# Patient Record
Sex: MALE | Race: WHITE | NOT HISPANIC OR LATINO | Employment: OTHER | URBAN - METROPOLITAN AREA
[De-identification: names, ages, dates, MRNs, and addresses within clinical notes are randomized per-mention and may not be internally consistent; named-entity substitution may affect disease eponyms.]

---

## 2017-07-29 ENCOUNTER — APPOINTMENT (OUTPATIENT)
Dept: GENERAL RADIOLOGY | Facility: HOSPITAL | Age: 72
End: 2017-07-29

## 2017-07-29 PROCEDURE — 73560 X-RAY EXAM OF KNEE 1 OR 2: CPT | Performed by: FAMILY MEDICINE

## 2022-09-13 ENCOUNTER — OFFICE VISIT (OUTPATIENT)
Dept: INTERNAL MEDICINE | Facility: CLINIC | Age: 77
End: 2022-09-13

## 2022-09-13 ENCOUNTER — LAB (OUTPATIENT)
Dept: LAB | Facility: HOSPITAL | Age: 77
End: 2022-09-13

## 2022-09-13 VITALS
HEIGHT: 62 IN | BODY MASS INDEX: 40.3 KG/M2 | DIASTOLIC BLOOD PRESSURE: 68 MMHG | SYSTOLIC BLOOD PRESSURE: 124 MMHG | HEART RATE: 52 BPM | OXYGEN SATURATION: 98 % | WEIGHT: 219 LBS

## 2022-09-13 DIAGNOSIS — E78.5 HYPERLIPIDEMIA, UNSPECIFIED HYPERLIPIDEMIA TYPE: ICD-10-CM

## 2022-09-13 DIAGNOSIS — Z00.00 MEDICARE ANNUAL WELLNESS VISIT, INITIAL: ICD-10-CM

## 2022-09-13 DIAGNOSIS — I10 PRIMARY HYPERTENSION: ICD-10-CM

## 2022-09-13 DIAGNOSIS — Z00.00 HEALTHCARE MAINTENANCE: ICD-10-CM

## 2022-09-13 DIAGNOSIS — Z00.00 VISIT FOR WELL MAN HEALTH CHECK: Primary | ICD-10-CM

## 2022-09-13 DIAGNOSIS — Z12.5 SCREENING FOR PROSTATE CANCER: ICD-10-CM

## 2022-09-13 LAB
ALBUMIN SERPL-MCNC: 4.6 G/DL (ref 3.5–5.2)
ALBUMIN/GLOB SERPL: 1.6 G/DL
ALP SERPL-CCNC: 55 U/L (ref 39–117)
ALT SERPL W P-5'-P-CCNC: 16 U/L (ref 1–41)
ANION GAP SERPL CALCULATED.3IONS-SCNC: 9.6 MMOL/L (ref 5–15)
AST SERPL-CCNC: 20 U/L (ref 1–40)
BASOPHILS # BLD AUTO: 0.01 10*3/MM3 (ref 0–0.2)
BASOPHILS NFR BLD AUTO: 0.2 % (ref 0–1.5)
BILIRUB SERPL-MCNC: 0.9 MG/DL (ref 0–1.2)
BUN SERPL-MCNC: 19 MG/DL (ref 8–23)
BUN/CREAT SERPL: 17 (ref 7–25)
CALCIUM SPEC-SCNC: 9 MG/DL (ref 8.6–10.5)
CHLORIDE SERPL-SCNC: 102 MMOL/L (ref 98–107)
CHOLEST SERPL-MCNC: 164 MG/DL (ref 0–200)
CO2 SERPL-SCNC: 28.4 MMOL/L (ref 22–29)
CREAT SERPL-MCNC: 1.12 MG/DL (ref 0.76–1.27)
DEPRECATED RDW RBC AUTO: 48.8 FL (ref 37–54)
EGFRCR SERPLBLD CKD-EPI 2021: 68.5 ML/MIN/1.73
EOSINOPHIL # BLD AUTO: 0.08 10*3/MM3 (ref 0–0.4)
EOSINOPHIL NFR BLD AUTO: 1.4 % (ref 0.3–6.2)
ERYTHROCYTE [DISTWIDTH] IN BLOOD BY AUTOMATED COUNT: 13.6 % (ref 12.3–15.4)
GLOBULIN UR ELPH-MCNC: 2.8 GM/DL
GLUCOSE SERPL-MCNC: 101 MG/DL (ref 65–99)
HBA1C MFR BLD: 5.5 % (ref 4.8–5.6)
HCT VFR BLD AUTO: 43.1 % (ref 37.5–51)
HDLC SERPL-MCNC: 54 MG/DL (ref 40–60)
HGB BLD-MCNC: 14.7 G/DL (ref 13–17.7)
IMM GRANULOCYTES # BLD AUTO: 0.02 10*3/MM3 (ref 0–0.05)
IMM GRANULOCYTES NFR BLD AUTO: 0.3 % (ref 0–0.5)
LDLC SERPL CALC-MCNC: 84 MG/DL (ref 0–100)
LDLC/HDLC SERPL: 1.49 {RATIO}
LYMPHOCYTES # BLD AUTO: 2.39 10*3/MM3 (ref 0.7–3.1)
LYMPHOCYTES NFR BLD AUTO: 40.4 % (ref 19.6–45.3)
MCH RBC QN AUTO: 33.3 PG (ref 26.6–33)
MCHC RBC AUTO-ENTMCNC: 34.1 G/DL (ref 31.5–35.7)
MCV RBC AUTO: 97.5 FL (ref 79–97)
MONOCYTES # BLD AUTO: 0.4 10*3/MM3 (ref 0.1–0.9)
MONOCYTES NFR BLD AUTO: 6.8 % (ref 5–12)
NEUTROPHILS NFR BLD AUTO: 3.01 10*3/MM3 (ref 1.7–7)
NEUTROPHILS NFR BLD AUTO: 50.9 % (ref 42.7–76)
NRBC BLD AUTO-RTO: 0 /100 WBC (ref 0–0.2)
PLATELET # BLD AUTO: 172 10*3/MM3 (ref 140–450)
PMV BLD AUTO: 9.6 FL (ref 6–12)
POTASSIUM SERPL-SCNC: 4.2 MMOL/L (ref 3.5–5.2)
PROT SERPL-MCNC: 7.4 G/DL (ref 6–8.5)
PSA SERPL-MCNC: 2.12 NG/ML (ref 0–4)
RBC # BLD AUTO: 4.42 10*6/MM3 (ref 4.14–5.8)
SODIUM SERPL-SCNC: 140 MMOL/L (ref 136–145)
T-UPTAKE NFR SERPL: 1.1 TBI (ref 0.8–1.3)
T4 SERPL-MCNC: 6.72 MCG/DL (ref 4.5–11.7)
TRIGL SERPL-MCNC: 148 MG/DL (ref 0–150)
TSH SERPL DL<=0.05 MIU/L-ACNC: 1.39 UIU/ML (ref 0.27–4.2)
VLDLC SERPL-MCNC: 26 MG/DL (ref 5–40)
WBC NRBC COR # BLD: 5.91 10*3/MM3 (ref 3.4–10.8)

## 2022-09-13 PROCEDURE — 1170F FXNL STATUS ASSESSED: CPT | Performed by: FAMILY MEDICINE

## 2022-09-13 PROCEDURE — 84436 ASSAY OF TOTAL THYROXINE: CPT | Performed by: FAMILY MEDICINE

## 2022-09-13 PROCEDURE — 2014F MENTAL STATUS ASSESS: CPT | Performed by: FAMILY MEDICINE

## 2022-09-13 PROCEDURE — 85025 COMPLETE CBC W/AUTO DIFF WBC: CPT | Performed by: FAMILY MEDICINE

## 2022-09-13 PROCEDURE — 99387 INIT PM E/M NEW PAT 65+ YRS: CPT | Performed by: FAMILY MEDICINE

## 2022-09-13 PROCEDURE — 36415 COLL VENOUS BLD VENIPUNCTURE: CPT | Performed by: FAMILY MEDICINE

## 2022-09-13 PROCEDURE — G0438 PPPS, INITIAL VISIT: HCPCS | Performed by: FAMILY MEDICINE

## 2022-09-13 PROCEDURE — 1126F AMNT PAIN NOTED NONE PRSNT: CPT | Performed by: FAMILY MEDICINE

## 2022-09-13 PROCEDURE — G0103 PSA SCREENING: HCPCS | Performed by: FAMILY MEDICINE

## 2022-09-13 PROCEDURE — 80061 LIPID PANEL: CPT | Performed by: FAMILY MEDICINE

## 2022-09-13 PROCEDURE — 83036 HEMOGLOBIN GLYCOSYLATED A1C: CPT | Performed by: FAMILY MEDICINE

## 2022-09-13 PROCEDURE — 99214 OFFICE O/P EST MOD 30 MIN: CPT | Performed by: FAMILY MEDICINE

## 2022-09-13 PROCEDURE — 84443 ASSAY THYROID STIM HORMONE: CPT | Performed by: FAMILY MEDICINE

## 2022-09-13 PROCEDURE — 80053 COMPREHEN METABOLIC PANEL: CPT | Performed by: FAMILY MEDICINE

## 2022-09-13 PROCEDURE — 3008F BODY MASS INDEX DOCD: CPT | Performed by: FAMILY MEDICINE

## 2022-09-13 PROCEDURE — 1159F MED LIST DOCD IN RCRD: CPT | Performed by: FAMILY MEDICINE

## 2022-09-13 PROCEDURE — 84479 ASSAY OF THYROID (T3 OR T4): CPT | Performed by: FAMILY MEDICINE

## 2022-09-13 RX ORDER — AMLODIPINE BESYLATE 5 MG/1
1 TABLET ORAL DAILY
COMMUNITY
Start: 2022-08-29 | End: 2022-09-13 | Stop reason: SDUPTHER

## 2022-09-13 RX ORDER — ATENOLOL 25 MG/1
25 TABLET ORAL DAILY
Qty: 90 TABLET | Refills: 2 | Status: SHIPPED | OUTPATIENT
Start: 2022-09-13

## 2022-09-13 RX ORDER — ATORVASTATIN CALCIUM 20 MG/1
20 TABLET, FILM COATED ORAL NIGHTLY
Qty: 90 TABLET | Refills: 2 | Status: SHIPPED | OUTPATIENT
Start: 2022-09-13

## 2022-09-13 RX ORDER — BENZONATATE 100 MG/1
100 CAPSULE ORAL EVERY 6 HOURS PRN
COMMUNITY
End: 2022-12-06

## 2022-09-13 RX ORDER — AMLODIPINE BESYLATE 5 MG/1
5 TABLET ORAL DAILY
Qty: 90 TABLET | Refills: 2 | Status: SHIPPED | OUTPATIENT
Start: 2022-09-13

## 2022-09-13 RX ORDER — MONTELUKAST SODIUM 10 MG/1
10 TABLET ORAL NIGHTLY
COMMUNITY

## 2022-09-13 RX ORDER — ATORVASTATIN CALCIUM 20 MG/1
1 TABLET, FILM COATED ORAL NIGHTLY
COMMUNITY
Start: 2022-06-24 | End: 2022-09-13 | Stop reason: SDUPTHER

## 2022-09-13 RX ORDER — ATENOLOL 25 MG/1
1 TABLET ORAL DAILY
COMMUNITY
Start: 2022-08-29 | End: 2022-09-13 | Stop reason: SDUPTHER

## 2022-09-13 RX ORDER — ASPIRIN 81 MG/1
81 TABLET ORAL DAILY
COMMUNITY

## 2022-09-13 RX ORDER — FLUTICASONE PROPIONATE 50 MCG
1 SPRAY, SUSPENSION (ML) NASAL DAILY
COMMUNITY

## 2022-09-13 NOTE — PROGRESS NOTES
The ABCs of the Annual Wellness Visit  Initial Medicare Wellness Visit    Chief Complaint   Patient presents with   • new patient visit   • Annual Exam   • Hyperlipidemia   • Hypertension   • Medicare Wellness-subsequent     Subjective   History of Present Illness:  Sudeep Shoemaker is a 76 y.o. male who presents for an Initial Medicare Wellness Visit.    The following portions of the patient's history were reviewed and   updated as appropriate: allergies, current medications, past family history, past medical history, past social history, past surgical history and problem list.     Compared to one year ago, the patient feels his physical   health is the same.    Compared to one year ago, the patient feels his mental   health is the same.    Recent Hospitalizations:  He was not admitted to the hospital during the last year.       Current Medical Providers:  Patient Care Team:  Rey Espinoza MD as PCP - General (Family Medicine)    Outpatient Medications Prior to Visit   Medication Sig Dispense Refill   • Albuterol (VENTOLIN IN) Inhale 2 sprays 4 (Four) Times a Day As Needed.     • amLODIPine (NORVASC) 5 MG tablet Take 1 tablet by mouth Daily.     • atenolol (TENORMIN) 25 MG tablet Take 1 tablet by mouth Daily.     • atorvastatin (LIPITOR) 20 MG tablet Take 1 tablet by mouth Every Night.     • benzonatate (TESSALON) 100 MG capsule Take 100 mg by mouth Every 6 (Six) Hours As Needed for Cough.     • Cetirizine HCl 10 MG capsule Take 1 capsule by mouth Daily.     • fluticasone (FLONASE) 50 MCG/ACT nasal spray 1 spray into the nostril(s) as directed by provider Daily.     • Mometasone Furo-Formoterol Fum (DULERA IN) Inhale.     • mometasone-formoterol (DULERA 200) 200-5 MCG/ACT inhaler Inhale 2 puffs 2 (Two) Times a Day.     • montelukast (SINGULAIR) 10 MG tablet Take 10 mg by mouth Every Night.     • aspirin 81 MG EC tablet Take 81 mg by mouth Daily.     • Atorvastatin Calcium (LIPITOR PO) Take  by mouth.     •  "azithromycin (ZITHROMAX) 250 MG tablet Take 2 tablets the first day, then 1 tablet daily for 4 days. 6 tablet 0   • Codeine Polt-Chlorphen Polt ER (TUZISTRA XR) 14.7-2.8 MG/5ML Suspension Extended Release Take 10 mL by mouth 2 (Two) Times a Day. 180 mL 0   • diclofenac (VOLTAREN) 1 % gel gel Apply 4 g topically 4 (Four) Times a Day As Needed (left knee pain). 100 g 0   • Nebivolol HCl (BYSTOLIC PO) Take  by mouth.     • oxaprozin (DAYPRO) 600 MG tablet Take 2 tablets by mouth Daily. 60 tablet 0     No facility-administered medications prior to visit.       No opioid medication identified on active medication list. I have reviewed chart for other potential  high risk medication/s and harmful drug interactions in the elderly.          Aspirin is on active medication list. Aspirin use is indicated based on review of current medical condition/s. Pros and cons of this therapy have been discussed today. Benefits of this medication outweigh potential harm.  Patient has been encouraged to continue taking this medication.  .      There is no problem list on file for this patient.    Advance Care Planning  Advance Directive is not on file.  ACP discussion was held with the patient during this visit. Patient has an advance directive (not in EMR), copy requested.          Objective       Vitals:    09/13/22 1304   BP: 124/68   BP Location: Left arm   Patient Position: Sitting   Cuff Size: Adult   Pulse: 52   SpO2: 98%   Weight: 99.3 kg (219 lb)   Height: 157.5 cm (62\")   PainSc: 0-No pain     Estimated body mass index is 40.06 kg/m² as calculated from the following:    Height as of this encounter: 157.5 cm (62\").    Weight as of this encounter: 99.3 kg (219 lb).    Class 3 Severe Obesity (BMI >=40). Obesity-related health conditions include the following: hypertension. Obesity is improving with lifestyle modifications. BMI is is above average; no BMI management plan is appropriate. We discussed portion control and increasing " exercise.      Does the patient have evidence of cognitive impairment? No    Physical Exam          HEALTH RISK ASSESSMENT    Smoking Status:  Social History     Tobacco Use   Smoking Status Never Smoker   Smokeless Tobacco Never Used     Alcohol Consumption:  Social History     Substance and Sexual Activity   Alcohol Use Yes     Fall Risk Screen:    ELENO Fall Risk Assessment was completed, and patient is at LOW risk for falls.Assessment completed on:9/13/2022    Depression Screen:   PHQ-2/PHQ-9 Depression Screening 9/13/2022   Little Interest or Pleasure in Doing Things 0-->not at all   Feeling Down, Depressed or Hopeless 0-->not at all   PHQ-9: Brief Depression Severity Measure Score 0       Health Habits and Functional and Cognitive Screening:  Functional & Cognitive Status 9/13/2022   Do you have difficulty preparing food and eating? No   Do you have difficulty bathing yourself, getting dressed or grooming yourself? No   Do you have difficulty using the toilet? No   Do you have difficulty moving around from place to place? No   Do you have trouble with steps or getting out of a bed or a chair? No   Current Diet Well Balanced Diet   Dental Exam Up to date   Eye Exam Up to date   Exercise (times per week) 7 times per week   Current Exercises Include Walking   Do you need help using the phone?  No   Are you deaf or do you have serious difficulty hearing?  No   Do you need help with transportation? No   Do you need help shopping? No   Do you need help preparing meals?  No   Do you need help with housework?  No   Do you need help with laundry? No   Do you need help taking your medications? No   Do you need help managing money? No   Do you ever drive or ride in a car without wearing a seat belt? No   Have you felt unusual stress, anger or loneliness in the last month? No   Who do you live with? Spouse   If you need help, do you have trouble finding someone available to you? No   Have you been bothered in the last  four weeks by sexual problems? No   Do you have difficulty concentrating, remembering or making decisions? No       Age-appropriate Screening Schedule:  Refer to the list below for future screening recommendations based on patient's age, sex and/or medical conditions. Orders for these recommended tests are listed in the plan section. The patient has been provided with a written plan.    Health Maintenance   Topic Date Due   • LIPID PANEL  08/16/2022   • INFLUENZA VACCINE  10/01/2022   • TDAP/TD VACCINES (2 - Td or Tdap) 02/15/2026   • ZOSTER VACCINE  Completed            Assessment & Plan   CMS Preventative Services Quick Reference  Risk Factors Identified During Encounter  None Identified  The above risks/problems have been discussed with the patient.  Follow up actions/plans if indicated are seen below in the Assessment/Plan Section.  Pertinent information has been shared with the patient in the After Visit Summary.    Diagnoses and all orders for this visit:    1. Visit for well man health check (Primary)    2. Medicare annual wellness visit, initial    3. Healthcare maintenance  -     CBC & Differential  -     Comprehensive Metabolic Panel  -     Hemoglobin A1c  -     Thyroid Panel With TSH  -     Lipid Panel With LDL / HDL Ratio    4. Screening for prostate cancer  -     PSA Screen        Follow Up:  No follow-ups on file.     An After Visit Summary and PPPS were made available to the patient.

## 2022-09-27 NOTE — PROGRESS NOTES
Lily Shoemaker is a 76 y.o. male and is here for a comprehensive physical exam. The patient reports no problems.            Social History:   Social History     Socioeconomic History   • Marital status:    Tobacco Use   • Smoking status: Never Smoker   • Smokeless tobacco: Never Used   Substance and Sexual Activity   • Alcohol use: Yes   • Drug use: Not Currently   • Sexual activity: Not Currently       Family History: No family history on file.    Past Medical History:   Past Medical History:   Diagnosis Date   • Hyperlipidemia    • Hypertension        The following portions of the patient's history were reviewed and updated as appropriate: allergies, current medications, past family history, past medical history, past social history, past surgical history and problem list.    Review of Systems    Review of Systems   Constitutional: Negative for chills and fever.   HENT: Negative for congestion, rhinorrhea, sinus pain and sore throat.    Eyes: Negative for photophobia and visual disturbance.   Respiratory: Negative for cough, chest tightness and shortness of breath.    Cardiovascular: Negative for chest pain and palpitations.   Gastrointestinal: Negative for diarrhea, nausea and vomiting.   Genitourinary: Negative for dysuria, frequency and urgency.   Skin: Negative for rash and wound.   Neurological: Negative for dizziness and syncope.   Psychiatric/Behavioral: Negative for behavioral problems and confusion.       Objective   Physical Exam  Vitals and nursing note reviewed.   Constitutional:       Appearance: He is well-developed.   HENT:      Head: Normocephalic and atraumatic.      Right Ear: External ear normal.      Left Ear: External ear normal.   Cardiovascular:      Rate and Rhythm: Normal rate and regular rhythm.      Heart sounds: Normal heart sounds.   Pulmonary:      Effort: Pulmonary effort is normal. No respiratory distress.      Breath sounds: Normal breath sounds.   Abdominal:       Palpations: Abdomen is soft.      Tenderness: There is no abdominal tenderness. There is no guarding.   Musculoskeletal:         General: Normal range of motion.      Cervical back: Normal range of motion and neck supple.   Lymphadenopathy:      Cervical: No cervical adenopathy.   Skin:     General: Skin is warm.   Neurological:      Mental Status: He is alert and oriented to person, place, and time.   Psychiatric:         Behavior: Behavior normal.         Vitals:    09/13/22 1304   BP: 124/68   Pulse: 52   SpO2: 98%     Body mass index is 40.06 kg/m².    Medications:   Current Outpatient Medications:   •  Albuterol (VENTOLIN IN), Inhale 2 sprays 4 (Four) Times a Day As Needed., Disp: , Rfl:   •  amLODIPine (NORVASC) 5 MG tablet, Take 1 tablet by mouth Daily., Disp: 90 tablet, Rfl: 2  •  atenolol (TENORMIN) 25 MG tablet, Take 1 tablet by mouth Daily., Disp: 90 tablet, Rfl: 2  •  atorvastatin (LIPITOR) 20 MG tablet, Take 1 tablet by mouth Every Night., Disp: 90 tablet, Rfl: 2  •  benzonatate (TESSALON) 100 MG capsule, Take 100 mg by mouth Every 6 (Six) Hours As Needed for Cough., Disp: , Rfl:   •  Cetirizine HCl 10 MG capsule, Take 1 capsule by mouth Daily., Disp: , Rfl:   •  fluticasone (FLONASE) 50 MCG/ACT nasal spray, 1 spray into the nostril(s) as directed by provider Daily., Disp: , Rfl:   •  Mometasone Furo-Formoterol Fum (DULERA IN), Inhale., Disp: , Rfl:   •  mometasone-formoterol (DULERA 200) 200-5 MCG/ACT inhaler, Inhale 2 puffs 2 (Two) Times a Day., Disp: , Rfl:   •  montelukast (SINGULAIR) 10 MG tablet, Take 10 mg by mouth Every Night., Disp: , Rfl:   •  aspirin 81 MG EC tablet, Take 81 mg by mouth Daily., Disp: , Rfl:   •  azithromycin (ZITHROMAX) 250 MG tablet, Take 2 tablets the first day, then 1 tablet daily for 4 days., Disp: 6 tablet, Rfl: 0       Assessment & Plan   Healthy male exam.      1. Healthcare Maintenance:  2. Patient Counseling:  --Nutrition: Stressed importance of moderation in  sodium/caffeine intake, saturated fat and cholesterol, caloric balance, sufficient intake of fresh fruits, vegetables, fiber, calcium and vit D  --Exercise: Recommended 30 minutes of exercise daily.   --Immunizations reviewed.  --Discussed benefits of screening colonoscopy.    Diagnoses and all orders for this visit:    Visit for well man health check    Medicare annual wellness visit, initial    Healthcare maintenance  -     CBC & Differential  -     Comprehensive Metabolic Panel  -     Hemoglobin A1c  -     Thyroid Panel With TSH  -     Lipid Panel With LDL / HDL Ratio    Screening for prostate cancer  -     PSA Screen    Primary hypertension  -     amLODIPine (NORVASC) 5 MG tablet; Take 1 tablet by mouth Daily.  -     atenolol (TENORMIN) 25 MG tablet; Take 1 tablet by mouth Daily.    Hyperlipidemia, unspecified hyperlipidemia type  -     atorvastatin (LIPITOR) 20 MG tablet; Take 1 tablet by mouth Every Night.    Other orders  -     Discontinue: atorvastatin (LIPITOR) 20 MG tablet; Take 1 tablet by mouth Every Night.  -     Discontinue: amLODIPine (NORVASC) 5 MG tablet; Take 1 tablet by mouth Daily.  -     aspirin 81 MG EC tablet; Take 81 mg by mouth Daily.  -     Discontinue: atenolol (TENORMIN) 25 MG tablet; Take 1 tablet by mouth Daily.  -     Cetirizine HCl 10 MG capsule; Take 1 capsule by mouth Daily.  -     fluticasone (FLONASE) 50 MCG/ACT nasal spray; 1 spray into the nostril(s) as directed by provider Daily.  -     benzonatate (TESSALON) 100 MG capsule; Take 100 mg by mouth Every 6 (Six) Hours As Needed for Cough.  -     montelukast (SINGULAIR) 10 MG tablet; Take 10 mg by mouth Every Night.  -     mometasone-formoterol (DULERA 200) 200-5 MCG/ACT inhaler; Inhale 2 puffs 2 (Two) Times a Day.        No follow-ups on file.           Dictated utilizing Dragon Voice Recognition Software

## 2022-09-27 NOTE — PROGRESS NOTES
"Chief Complaint  new patient visit, Annual Exam, Hyperlipidemia, Hypertension, and Medicare Wellness-subsequent    Subjective        Sudeep Shoemaker presents to [facility]  History of Present Illness    Patient is a history of having essential hypertension.  Blood pressure is 124/68.  In the past he is taking amlodipine 5 mg daily.  He is also taking atenolol 25 mg daily.  He does need new prescriptions for these medications as he has run out of these medicines few days ago.    Patient has a history of having hyperlipidemia.  Patient was taking atorvastatin 20 mg in the past.  Patient denies any side effects of the medication.  He would like to restart taking this medicine.    Objective   Vital Signs:  /68 (BP Location: Left arm, Patient Position: Sitting, Cuff Size: Adult)   Pulse 52   Ht 157.5 cm (62\")   Wt 99.3 kg (219 lb)   SpO2 98%   BMI 40.06 kg/m²   Estimated body mass index is 40.06 kg/m² as calculated from the following:    Height as of this encounter: 157.5 cm (62\").    Weight as of this encounter: 99.3 kg (219 lb).          Physical Exam  Vitals and nursing note reviewed.   Constitutional:       Appearance: He is well-developed.   HENT:      Head: Normocephalic and atraumatic.   Musculoskeletal:      Cervical back: Normal range of motion and neck supple.   Neurological:      Mental Status: He is alert and oriented to person, place, and time.   Psychiatric:         Behavior: Behavior normal.        Result Review :                Assessment and Plan   Diagnoses and all orders for this visit:        Healthcare maintenance  -     CBC & Differential  -     Comprehensive Metabolic Panel  -     Hemoglobin A1c  -     Thyroid Panel With TSH  -     Lipid Panel With LDL / HDL Ratio    Primary hypertension  -     amLODIPine (NORVASC) 5 MG tablet; Take 1 tablet by mouth Daily.  Dispense: 90 tablet; Refill: 2  -     atenolol (TENORMIN) 25 MG tablet; Take 1 tablet by mouth Daily.  " Dispense: 90 tablet; Refill: 2  -     We will restart patient on amlodipine and atenolol.    Hyperlipidemia, unspecified hyperlipidemia type  -     atorvastatin (LIPITOR) 20 MG tablet; Take 1 tablet by mouth Every Night.  Dispense: 90 tablet; Refill: 2  -     We will restart patient on atorvastatin 20 mg daily.             Follow Up   No follow-ups on file.  Patient was given instructions and counseling regarding his condition or for health maintenance advice. Please see specific information pulled into the AVS if appropriate.

## 2023-06-12 DIAGNOSIS — E78.5 HYPERLIPIDEMIA, UNSPECIFIED HYPERLIPIDEMIA TYPE: ICD-10-CM

## 2023-06-12 RX ORDER — ATORVASTATIN CALCIUM 20 MG/1
TABLET, FILM COATED ORAL
Qty: 90 TABLET | Refills: 0 | Status: SHIPPED | OUTPATIENT
Start: 2023-06-12

## 2023-08-08 DIAGNOSIS — I10 PRIMARY HYPERTENSION: ICD-10-CM

## 2023-08-08 RX ORDER — ATENOLOL 25 MG/1
TABLET ORAL
Qty: 90 TABLET | Refills: 3 | Status: SHIPPED | OUTPATIENT
Start: 2023-08-08

## 2023-09-08 DIAGNOSIS — E78.5 HYPERLIPIDEMIA, UNSPECIFIED HYPERLIPIDEMIA TYPE: ICD-10-CM

## 2023-09-08 RX ORDER — ATORVASTATIN CALCIUM 20 MG/1
TABLET, FILM COATED ORAL
Qty: 90 TABLET | Refills: 1 | Status: SHIPPED | OUTPATIENT
Start: 2023-09-08

## 2024-03-06 DIAGNOSIS — E78.5 HYPERLIPIDEMIA, UNSPECIFIED HYPERLIPIDEMIA TYPE: ICD-10-CM

## 2024-03-06 RX ORDER — ATORVASTATIN CALCIUM 20 MG/1
TABLET, FILM COATED ORAL
Qty: 90 TABLET | Refills: 3 | OUTPATIENT
Start: 2024-03-06

## 2024-03-25 ENCOUNTER — HOSPITAL ENCOUNTER (OUTPATIENT)
Facility: HOSPITAL | Age: 79
Discharge: HOME | End: 2024-03-25
Attending: EMERGENCY MEDICINE | Admitting: EMERGENCY MEDICINE
Payer: MEDICARE

## 2024-03-25 VITALS
HEART RATE: 70 BPM | SYSTOLIC BLOOD PRESSURE: 147 MMHG | HEIGHT: 62 IN | RESPIRATION RATE: 16 BRPM | BODY MASS INDEX: 38.64 KG/M2 | WEIGHT: 210 LBS | OXYGEN SATURATION: 96 % | DIASTOLIC BLOOD PRESSURE: 70 MMHG | TEMPERATURE: 97.8 F

## 2024-03-25 DIAGNOSIS — H91.92 HEARING LOSS OF LEFT EAR, UNSPECIFIED HEARING LOSS TYPE: Primary | ICD-10-CM

## 2024-03-25 PROCEDURE — G0463 HOSPITAL OUTPT CLINIC VISIT: HCPCS

## 2024-03-25 RX ORDER — METHYLPREDNISOLONE 4 MG/1
TABLET ORAL
Qty: 21 TABLET | Refills: 0 | Status: SHIPPED | OUTPATIENT
Start: 2024-03-25

## 2024-03-25 RX ORDER — IBUPROFEN 800 MG/1
800 TABLET ORAL
Qty: 30 TABLET | Refills: 0 | Status: SHIPPED | OUTPATIENT
Start: 2024-03-25

## 2024-03-25 NOTE — DISCHARGE INSTRUCTIONS
Take steroid pack as prescribed until completion.  Use regular NSAIDs, such as ibuprofen 800 mg 3 times daily.  Follow-up with ENT/audiology as soon as possible for visit.  I have placed a referral today.  Return to emergency department for worsening symptoms or other medical emergencies.  Recommended follow-up with PCP.  Refer to the attached instructions for further information.

## 2024-03-25 NOTE — FSED PROVIDER NOTE
Subjective   History of Present Illness  Patient is a 78-year-old male who presents to the emergency department for left hearing loss x 3 days.  Patient reports he woke up 1 morning with the hearing change.  He had otherwise been feeling well.  Denies acute upper respiratory symptoms or ear pain.  Has long history of tinnitus from childhood infection.  Is not followed by ENT or audiologist.  Has not previously required hearing aids.        Review of Systems   Constitutional:  Negative for appetite change, chills, diaphoresis, fatigue and fever.   HENT:  Positive for hearing loss. Negative for congestion, ear discharge, ear pain, rhinorrhea, sinus pressure, sore throat and trouble swallowing.    Eyes:  Negative for pain, redness and visual disturbance.   Respiratory:  Negative for cough and shortness of breath.    Cardiovascular:  Negative for chest pain.   Gastrointestinal:  Negative for abdominal pain.   Skin:  Negative for color change, pallor, rash and wound.   Neurological:  Negative for dizziness, tremors, seizures, syncope, facial asymmetry, speech difficulty, weakness, light-headedness, numbness and headaches.       Past Medical History:   Diagnosis Date    Hyperlipidemia     Hypertension        No Known Allergies    Past Surgical History:   Procedure Laterality Date    COLONOSCOPY      CYSTECTOMY      TONSILLECTOMY         History reviewed. No pertinent family history.    Social History     Socioeconomic History    Marital status:    Tobacco Use    Smoking status: Never    Smokeless tobacco: Never   Substance and Sexual Activity    Alcohol use: Yes    Drug use: Not Currently    Sexual activity: Not Currently           Objective   Physical Exam  Constitutional:       General: He is not in acute distress.     Appearance: He is not ill-appearing, toxic-appearing or diaphoretic.   HENT:      Right Ear: Tympanic membrane, ear canal and external ear normal.      Left Ear: Tympanic membrane, ear canal and  external ear normal.      Nose: Nose normal.      Mouth/Throat:      Mouth: Mucous membranes are moist.      Pharynx: Oropharynx is clear.   Eyes:      Extraocular Movements: Extraocular movements intact.      Conjunctiva/sclera: Conjunctivae normal.      Pupils: Pupils are equal, round, and reactive to light.   Pulmonary:      Effort: Pulmonary effort is normal. No respiratory distress.   Skin:     General: Skin is warm and dry.   Neurological:      Mental Status: He is alert.   Psychiatric:         Mood and Affect: Mood normal.         Behavior: Behavior normal.         Procedures           ED Course                                           Medical Decision Making  Patient is a 78-year-old male who presents for hearing loss.  Exam is unremarkable.  No indication for infection or increased fluid.  Will trial steroids and NSAIDs, but recommend close follow-up with ENT and audiology.  Referral placed today.  Discussed when to return to the emergency department.  Answered all questions.  Patient verbalized understanding and was agreeable to plan and discharge.  My differential diagnosis includes was not limited to: Otitis media, otitis externa, cerumen impaction, and active hearing loss, sensorineural hearing loss, neuroma Ménière's disease, viral infection, labyrinthitis, vertigo    Problems Addressed:  Hearing loss of left ear, unspecified hearing loss type: complicated acute illness or injury    Risk  Prescription drug management.        Final diagnoses:   Hearing loss of left ear, unspecified hearing loss type       ED Disposition  ED Disposition       ED Disposition   Discharge    Condition   Stable    Comment   --               Hakeem Hobbs MD  [facility] [address]  [phone]               Medication List        New Prescriptions      ibuprofen 800 MG tablet  Commonly known as: ADVIL,MOTRIN  Take 1 tablet by mouth 3 (Three) Times a Day With Meals.     methylPREDNISolone 4  MG dose pack  Commonly known as: MEDROL  6 tabs PO x1 day, 5 tabs PO x1 day, and continue decrease of 1 tablet/day.  6 days total treatment.               Where to Get Your Medications        These medications were sent to [id number] - [place], [place] - [address] AT [facility] - [phone]  - [phone]   [address] [facility], [facility] [address]      Phone: [phone]   ibuprofen 800 MG tablet  methylPREDNISolone 4 MG dose pack

## 2024-08-02 DIAGNOSIS — I10 PRIMARY HYPERTENSION: ICD-10-CM

## 2024-08-02 RX ORDER — ATENOLOL 25 MG/1
TABLET ORAL
Qty: 90 TABLET | Refills: 3 | Status: SHIPPED | OUTPATIENT
Start: 2024-08-02

## 2024-09-03 ENCOUNTER — HOSPITAL ENCOUNTER (OUTPATIENT)
Facility: HOSPITAL | Age: 79
Discharge: HOME | End: 2024-09-03
Attending: EMERGENCY MEDICINE | Admitting: EMERGENCY MEDICINE
Payer: MEDICARE

## 2024-09-03 VITALS
RESPIRATION RATE: 14 BRPM | DIASTOLIC BLOOD PRESSURE: 70 MMHG | HEART RATE: 56 BPM | WEIGHT: 210 LBS | HEIGHT: 62 IN | TEMPERATURE: 98.1 F | SYSTOLIC BLOOD PRESSURE: 137 MMHG | BODY MASS INDEX: 38.64 KG/M2 | OXYGEN SATURATION: 97 %

## 2024-09-03 DIAGNOSIS — R21 RASH: Primary | ICD-10-CM

## 2024-09-03 PROCEDURE — G0463 HOSPITAL OUTPT CLINIC VISIT: HCPCS | Performed by: PHYSICIAN ASSISTANT

## 2024-09-03 RX ORDER — HYDROXYZINE HYDROCHLORIDE 25 MG/1
25 TABLET, FILM COATED ORAL EVERY 6 HOURS PRN
Qty: 15 TABLET | Refills: 0 | Status: SHIPPED | OUTPATIENT
Start: 2024-09-03 | End: 2024-09-08

## 2024-09-03 RX ORDER — CLOTRIMAZOLE AND BETAMETHASONE DIPROPIONATE 10; .64 MG/G; MG/G
1 CREAM TOPICAL 2 TIMES DAILY
Qty: 20 G | Refills: 0 | Status: SHIPPED | OUTPATIENT
Start: 2024-09-03 | End: 2024-09-13

## 2024-09-03 RX ORDER — PREDNISONE 20 MG/1
20 TABLET ORAL 2 TIMES DAILY
Qty: 10 TABLET | Refills: 0 | Status: SHIPPED | OUTPATIENT
Start: 2024-09-03 | End: 2024-09-08

## 2024-09-03 NOTE — [facility] PROVIDER NOTE
Subjective   History of Present Illness    Patient is complaining of a rash to his left groin/left thighfor about 4 months.  He reports that it burns and itches.  Is occasionally applied fluconazole ointment with minimal relief.  He is now here due to persistent symptoms.  Patient reports that he goes a dermatologist every year and does not have an appointment until March 2025.  Denies any fever or bodyaches.  Denies any new soaps or laundry detergents.    Review of Systems   Constitutional:  Negative for activity change and appetite change.   Eyes:  Negative for pain.   Respiratory:  Negative for shortness of breath.    Gastrointestinal:  Negative for nausea and vomiting.   Musculoskeletal:  Negative for arthralgias.   Skin:  Positive for rash. Negative for color change.   Neurological:  Negative for dizziness.   All other systems reviewed and are negative.      Past Medical History:   Diagnosis Date    Hyperlipidemia     Hypertension        No Known Allergies    Past Surgical History:   Procedure Laterality Date    COLONOSCOPY      CYSTECTOMY      TONSILLECTOMY         History reviewed. No pertinent family history.    Social History     Socioeconomic History    Marital status:    Tobacco Use    Smoking status: Never    Smokeless tobacco: Never   Substance and Sexual Activity    Alcohol use: Yes    Drug use: Not Currently    Sexual activity: Not Currently           Objective   Physical Exam  Vitals and nursing note reviewed.   Constitutional:       Appearance: Normal appearance. He is normal weight.   HENT:      Head: Normocephalic and atraumatic.      Nose: Nose normal.      Mouth/Throat:      Mouth: Mucous membranes are moist.   Eyes:      Pupils: Pupils are equal, round, and reactive to light.   Cardiovascular:      Rate and Rhythm: Normal rate and regular rhythm.      Pulses: Normal pulses.      Heart sounds: Normal heart sounds.   Pulmonary:      Effort: Pulmonary effort is normal.      Breath sounds:  Normal breath sounds.   Musculoskeletal:         General: Normal range of motion.      Cervical back: Normal range of motion.      Right lower leg: No edema.      Left lower leg: No edema.   Skin:     General: Skin is warm.      Findings: Rash is macular, scaling and urticarial. Rash is not purpuric or vesicular.          Neurological:      General: No focal deficit present.      Mental Status: He is alert.   Psychiatric:         Behavior: Behavior is cooperative.         Procedures           ED Course                                           Medical Decision Making  Problems Addressed:  Rash: complicated acute illness or injury    Risk  Prescription drug management.        Final diagnoses:   Rash       ED Disposition  ED Disposition       ED Disposition   Discharge    Condition   Stable    Comment   --               Rey Espinoza MD  [id number] [facility] [address]  [place] [id number] [phone]               Medication List        New Prescriptions      clotrimazole-betamethasone 1-0.05 % cream  Commonly known as: LOTRISONE  Apply 1 Application topically to the appropriate area as directed 2 (Two) Times a Day for 10 days.     hydrOXYzine 25 MG tablet  Commonly known as: ATARAX  Take 1 tablet by mouth Every 6 (Six) Hours As Needed for Itching for up to 5 days.     predniSONE 20 MG tablet  Commonly known as: DELTASONE  Take 1 tablet by mouth 2 (Two) Times a Day for 5 days.               Where to Get Your Medications        These medications were sent to [id number] - [facility] [place], [place] - [address] - [phone]  - [phone]   [address] [place] [address]      Phone: [phone]   clotrimazole-betamethasone 1-0.05 % cream  hydrOXYzine 25 MG tablet  predniSONE 20 MG tablet

## 2024-09-03 NOTE — DISCHARGE INSTRUCTIONS
It is very important you keep the affected area dry before applying  the prescribed cream. Take the medications as prescribed.  I advise he follow-up with your primary care doctor or call your dermatologist next week to recheck your rash.

## 2025-07-28 DIAGNOSIS — I10 PRIMARY HYPERTENSION: ICD-10-CM

## 2025-07-28 RX ORDER — ATENOLOL 25 MG/1
25 TABLET ORAL DAILY
Qty: 90 TABLET | Refills: 3 | Status: SHIPPED | OUTPATIENT
Start: 2025-07-28

## 2025-07-28 NOTE — TELEPHONE ENCOUNTER
Requested Prescriptions     Pending Prescriptions Disp Refills    atenolol (TENORMIN) 25 MG tablet [Pharmacy Med Name: ATENOLOL TABS 25MG] 90 tablet 3     Sig: TAKE 1 TABLET DAILY